# Patient Record
Sex: FEMALE | Race: WHITE
[De-identification: names, ages, dates, MRNs, and addresses within clinical notes are randomized per-mention and may not be internally consistent; named-entity substitution may affect disease eponyms.]

---

## 2020-11-27 ENCOUNTER — HOSPITAL ENCOUNTER (EMERGENCY)
Dept: HOSPITAL 75 - ER | Age: 20
Discharge: HOME | End: 2020-11-27
Payer: COMMERCIAL

## 2020-11-27 VITALS — HEIGHT: 66.02 IN | WEIGHT: 260.59 LBS | BODY MASS INDEX: 41.88 KG/M2

## 2020-11-27 VITALS — SYSTOLIC BLOOD PRESSURE: 143 MMHG | DIASTOLIC BLOOD PRESSURE: 81 MMHG

## 2020-11-27 DIAGNOSIS — R22.9: Primary | ICD-10-CM

## 2020-11-27 DIAGNOSIS — T45.0X5A: ICD-10-CM

## 2020-11-27 PROCEDURE — 99284 EMERGENCY DEPT VISIT MOD MDM: CPT

## 2020-11-27 NOTE — ED GENERAL
General


Chief Complaint:  Allergic Reaction


Stated Complaint:  ALLERGIC RXN


Nursing Triage Note:  


Pt ambulates to room #5 with c/o allergic reaction. Pt reports on the evening of




11/26/20 her bottom lip began to swell. Pt reports she awoke this morning and 


noted her top lip to be swollen. Pt denies SOA, tongue, or throat discomfort. 


A&OX4.


Nursing Sepsis Screen:  No Definite Risk


Source of Information:  Patient





History of Present Illness


Date Seen by Provider:  Nov 27, 2020


Time Seen by Provider:  05:25


Initial Comments


PT ARRIVES VIA POV FROM HOME--DROVE SELF HERE


C/O SWELLING TO LIPS SINCE 1730 YESTERDAY ( THANKSGIVING ) 


STATES IT STARTED WITH HER LOWER LIP


TOOK 2 BENADRYL AROUND 1930, IS NOT SURE IF IT HELPED OR NOT


WOKE UP THIS MORNING AND NOW HER UPPER LIP IS SWOLLEN


NO SWELLING OF TONGUE OR THROAT


NO DIFFICULTY BREATHING, SWALLOWING OR TALKING


NO RASH OR ITCHING ANYWHERE





NO HISTORY OF SIMILAR


NO NEW MEDICATIONS





TAKES CLARITIN FOR ALLERGIES, BUT HAS NOT TAKEN ANY THIS MORNING





LMP 3 YEARS AGO. HAS NEXPLANON IN PLACE SINCE THEN.





PCP: CASSIUS, MATT BRAXTON





Allergies and Home Medications


Allergies


Coded Allergies:  


     No Known Drug Allergies (Unverified , 11/27/20)





Patient Home Medication List


Home Medication List Reviewed:  Yes





Review of Systems


Review of Systems


Constitutional:  no symptoms reported


EENTM:  see HPI; No mouth pain, No nose congestion, No throat pain, No throat 

swelling


Respiratory:  no symptoms reported; No cough, No short of breath, No wheezing


Cardiovascular:  no symptoms reported; No edema


Gastrointestinal:  no symptoms reported


Genitourinary:  no symptoms reported


Pregnant:  No


Musculoskeletal:  no symptoms reported


Skin:  no symptoms reported; No pruritus, No rash


Psychiatric/Neurological:  No Symptoms Reported


Hematologic/Lymphatic:  No Symptoms Reported


Immunological/Allergic:  no symptoms reported





Past Medical-Social-Family Hx


Past Med/Social Hx:  Reviewed and Corrections made


Patient Social History


Recent Foreign Travel:  No


Contact w/Someone Who Travel:  No


Recent Infectious Disease Expo:  No





Seasonal Allergies


Seasonal Allergies:  Yes





Past Medical History


Pregnant:  No (NEXPLANON IN PLACE SINCE 2017)





Physical Exam


Vital Signs





Vital Signs - First Documented








 11/27/20





 05:24


 


Temp 36.2


 


Pulse 99


 


Resp 18


 


B/P (MAP) 143/81 (101)


 


Pulse Ox 98


 


O2 Delivery Room Air





Capillary Refill : Less Than 3 Seconds


Height, Weight, BMI


Height: '"


Weight: lbs. oz. kg; 42.00 BMI


Method:


General Appearance:  No Apparent Distress, WD/WN


HEENT:  PERRL/EOMI, Pharynx Normal, Moist Mucous Membranes, Other (HAS MILD TO 

MODERATE SWELLING TO LIPS--UPPER GREATER THAN LOWER. NO SWELLING TO TONGUE OR 

POSTERIOR PHARYNX. VOICE IS NORMAL. )


Respiratory:  Normal Breath Sounds, No Accessory Muscle Use, No Respiratory 

Distress; No Stridor, No Wheezing


Cardiovascular:  Regular Rate, Rhythm, No Edema


Extremity:  Normal Inspection


Neurologic/Psychiatric:  Alert, Oriented x3, No Motor/Sensory Deficits, Normal 

Mood/Affect, CNs II-XII Norm as Tested


Skin:  Normal Color, Warm/Dry; No Rash





Progress/Results/Core Measures


Suspected Sepsis


Recent Fever Within 48 Hours:  No


Infection Criteria Present:  None


New/Unexplained  Altered Menta:  No


Sepsis Screen:  No Definite Risk


SIRS


Temperature: 


Pulse: 99 


Respiratory Rate: 18


 


Blood Pressure 143 /81 


Mean: 101





Results/Orders


My Orders





Orders - ANTHONY PRICE DO


Methylprednisolone Sod Succ (Solu-Medrol (11/27/20 05:33)


Famotidine Tablet (Pepcid Tablet) (11/27/20 05:33)





Vital Signs/I&O











 11/27/20





 05:24


 


Temp 36.2


 


Pulse 99


 


Resp 18


 


B/P (MAP) 143/81 (101)


 


Pulse Ox 98


 


O2 Delivery Room Air





Capillary Refill : Less Than 3 Seconds








Blood Pressure Mean:                    101








Progress Note :  


Progress Note


GIVEN SOLU-MEDROL IM, AND PEPCID PO


PT ADVISED TO TAKE HER CLARITIN AS SOON AS SHE GETS HOME, OR MAY TAKE 50 MG 

BENADRYL AS SHE DROVE HERSELF HERE.





Departure


Impression





   Primary Impression:  


   ALLERGIC REACTION UNKNOWN CAUSE


Disposition:  01 HOME, SELF-CARE


Condition:  Stable





Departure-Patient Inst.


Referrals:  


ORALIA BRAXTON (PCP/Family)


Primary Care Physician


Patient Instructions:  Food Allergy





Add. Discharge Instructions:  


LOTS OF CLEAR LIQUIDS





TAKE YOUR CLARITIN WHEN YOU GET HOME, OR YOU MAY TAKE BENADRYL 50 MG EVERY 4 

HOURS AS NEEDED FOR SWELLING





COOL COMPRESSES TO LIPS





AVOID ANY NEW FOODS, DRINKS, PRODUCTS, ETC. 





RETURN TO ER IF YOU DEVELOP DIFFICULTY BREATHING OR SWALLOWING. 





All discharge instructions reviewed with patient and/or family. Voiced und

erstanding.


Scripts


Prednisone (Prednisone) 20 Mg Tab


40 MG PO DAILY, #6 TAB 0 Refills


   Prov: ANTHONY PRICE DO         11/27/20 


Famotidine (Pepcid) 40 Mg Tablet


40 MG PO DAILY, #10 TAB


   Prov: ANTHONY PRICE DO         11/27/20











ANTHONY PRICE DO                 Nov 27, 2020 05:39